# Patient Record
Sex: FEMALE | Race: BLACK OR AFRICAN AMERICAN | Employment: UNEMPLOYED | URBAN - METROPOLITAN AREA
[De-identification: names, ages, dates, MRNs, and addresses within clinical notes are randomized per-mention and may not be internally consistent; named-entity substitution may affect disease eponyms.]

---

## 2017-04-17 ENCOUNTER — HOSPITAL ENCOUNTER (OUTPATIENT)
Dept: MRI IMAGING | Age: 62
Discharge: HOME | End: 2017-04-17
Attending: ORTHOPAEDIC SURGERY
Payer: MEDICAID

## 2017-04-17 DIAGNOSIS — M17.12 PRIMARY OSTEOARTHRITIS OF LEFT KNEE: ICD-10-CM

## 2017-04-17 PROCEDURE — 73721 MRI JNT OF LWR EXTRE W/O DYE: CPT

## 2017-04-27 ENCOUNTER — HOSPITAL ENCOUNTER (OUTPATIENT)
Dept: MAMMOGRAPHY | Age: 62
Discharge: HOME | End: 2017-04-27
Attending: FAMILY MEDICINE
Payer: MEDICAID

## 2017-04-27 DIAGNOSIS — Z12.31 VISIT FOR SCREENING MAMMOGRAM: ICD-10-CM

## 2017-04-27 PROCEDURE — 77067 SCR MAMMO BI INCL CAD: CPT

## 2018-10-09 ENCOUNTER — HOSPITAL ENCOUNTER (OUTPATIENT)
Dept: MAMMOGRAPHY | Age: 63
Discharge: HOME | End: 2018-10-09
Attending: FAMILY MEDICINE
Payer: MEDICAID

## 2018-10-09 DIAGNOSIS — Z12.31 VISIT FOR SCREENING MAMMOGRAM: ICD-10-CM

## 2018-10-09 PROCEDURE — 77067 SCR MAMMO BI INCL CAD: CPT

## 2019-05-07 ENCOUNTER — APPOINTMENT (OUTPATIENT)
Dept: PHYSICAL THERAPY | Age: 64
End: 2019-05-07
Payer: MEDICAID

## 2019-05-15 ENCOUNTER — HOSPITAL ENCOUNTER (OUTPATIENT)
Dept: PHYSICAL THERAPY | Age: 64
Discharge: HOME | End: 2019-05-15
Payer: MEDICAID

## 2019-05-15 PROCEDURE — 97110 THERAPEUTIC EXERCISES: CPT

## 2019-05-15 PROCEDURE — 97530 THERAPEUTIC ACTIVITIES: CPT

## 2019-05-15 PROCEDURE — 97163 PT EVAL HIGH COMPLEX 45 MIN: CPT

## 2019-05-15 NOTE — PROGRESS NOTES
[facility]ITO SAYRA [facility] 
[address] [place], [address] [place] [address] 
[phone] fax Plan of Care/ Statement of Necessity for Physical Therapy Services Patient name: Eligio Souza Start of Care: 5/15/2019 Referral source: Alec Conley MD : 1955 Medical Diagnosis: Muscle weakness (generalized) [M62.81] Payor: [facility] MEDICAID / Plan: [address] [place] [address] / Product Type: Managed Care Medicaid /  Onset Date:19 Treatment Diagnosis: low back, knee, ankle pain Prior Hospitalization: see medical history Provider#: [id number] Medications: Verified on Patient summary List  
 Comorbidities: arthritis, bronchitis, thyroid problems, high blood pressure, asthma Prior Level of Function: Functionally independent, was ambulating without Rollator, lives alone The Plan of Care and following information is based on the information from the initial evaluation. Assessment/ key information: Patient is a pleasant 60year old female who presents with complaints of chronic back, bilateral knee, and Right ankle pain over the past several years due to general wear and tear, with recent worsening over this year. She reports using a Rollator the past 7-8 months, but overall very limited standing and walking tolerance, limiting ability to cook meals and relying on family to go to the store for her. Unable to ambulate to mailbox. At evaluation Patient demonstrates the following lumbar AROM: flexion 75% of full with Right sided back pain, extension 25% of full with pain, rotation 25% of full bilaterally with pain, lateral flexion Right 75% of full and Left WNL with pain. LE strength impaired due to pain: hip rotation Right 4-/5 Left 4+/5, knee extension Right 3-/5 Left 4+/5, knee flexion Right 4-/5 Left 5/5, hip flexion Right 3-/5 Left 4/5. Very tender in the lumbar musculature.  Poor bridging abilities due to weakness, and Patient uses UEs to assist Right LE into bed. Overall she is a good rehab candidate based on motivation, and will benefit from skilled physical therapy in order to address the above deficits. Evaluation Complexity History HIGH Complexity :3+ comorbidities / personal factors will impact the outcome/ POC ; Examination MEDIUM Complexity : 3 Standardized tests and measures addressing body structure, function, activity limitation and / or participation in recreation  ;Presentation LOW Complexity : Stable, uncomplicated  ;Clinical Decision Making HIGH Complexity : FOTO score of 1- 25 Overall Complexity Rating: HIGH Problem List: pain affecting function, decrease ROM, decrease strength, edema affecting function, impaired gait/ balance, decrease ADL/ functional abilitiies, decrease activity tolerance, decrease flexibility/ joint mobility and decrease transfer abilities Treatment Plan may include any combination of the following: Therapeutic exercise, Therapeutic activities, Neuromuscular re-education, Physical agent/modality, Gait/balance training, Manual therapy, Aquatic therapy, Patient education, Self Care training, Functional mobility training, Home safety training and Stair training Patient / Family readiness to learn indicated by: asking questions, trying to perform skills and interest 
Persons(s) to be included in education: patient (P) Barriers to Learning/Limitations: None Patient Goal (s): to feel better Patient Self Reported Health Status: poor Rehabilitation Potential: fair Short Term Goals: To be accomplished in 1 weeks: 
Goal: Patient will be independent and compliant with HEP in order to improve ease of mobility. Status at last note/certification: Issued and reviewed Goal: Patient will initiate aquatic therapy without incident or increase in pain in order to progress toward long term goals. Status at last note/certification n/a Long Term Goals: To be accomplished in 10 treatments: Goal: Patient will improve FOTO assessment score to 34 pts in order to indicate improved functional abilities. Status at last note/certification: 22 pts Goal: Patient will improve lumbar AROM to at least 75% of full all planes without increased pain in order to improve ease of daily tasks. Status at last note/certification: flexion 75% of full with Right sided back pain, extension 25% of full with pain, rotation 25% of full bilaterally with pain, lateral flexion Right 75% of full and Left WNL with pain Goal: Patient will improve Right LE strength to at least 4+/5 in order to improve stability with ambulation. Status at last note/certification: hip flexion/knee extension 3-/5, knee flexion 4-/5, hip rotation 4-/5 Goal: Patient will report worst back/knee/ankle pain as 6/10 or less in order to be able to walk around a room with no limitation. Status at last note/certification: 43/11; limited a lot Frequency / Duration: Patient to be seen 2 times per week for 10 treatments. Patient/ Caregiver education and instruction: Diagnosis, prognosis, exercises 
 [x]  Plan of care has been reviewed with MODE Eden, PT 5/15/2019 9:34 AM 
_____________________________________________________________________ I certify that the above Therapy Services are being furnished while the patient is under my care. I agree with the treatment plan and certify that this therapy is necessary. [de-identified] Signature:____________Date:_________TIME:________ 
 
[employer], Date and Time must be completed for valid certification ** Please sign and return to [facility] GATITO VALENZUELA [facility], [address] [place] [address] 
[phone] fax

## 2019-05-15 NOTE — PROGRESS NOTES
PT DAILY TREATMENT NOTE 10-18 Patient Name: Bee Blanchard Date:5/15/2019 : 1955 [x]  Patient  Verified Payor: [facility] MEDICAID / Plan: [address] [place] [address] / Product Type: Managed Care Medicaid / In time:945  Out time:1020 Total Treatment Time (min): 35 Visit #: 1 of 10 Medicare/BCBS Only Total Timed Codes (min):   1:1 Treatment Time:    
 
 
Treatment Area: Muscle weakness (generalized) [M62.81] SUBJECTIVE Pain Level (0-10 scale): 9-10 Any medication changes, allergies to medications, adverse drug reactions, diagnosis change, or new procedure performed?: [x] No    [] Yes (see summary sheet for update) Subjective functional status/changes:   [] No changes reported OBJECTIVE Modality rationale:   
Min Type Additional Details  
 [] Estim:  []Unatt       []IFC  []Premod []Other:  []w/ice   []w/heat Position: Location:  
 [] Estim: []Att    []TENS instruct  []NMES []Other:  []w/US   []w/ice   []w/heat Position: Location:  
 []  Traction: [] Cervical       []Lumbar 
                     [] Prone          []Supine []Intermittent   []Continuous Lbs: 
[] before manual 
[] after manual  
 []  Ultrasound: []Continuous   [] Pulsed []1MHz   []3MHz W/cm2: 
Location:  
 []  Iontophoresis with dexamethasone Location: [] Take home patch  
[] In clinic  
 []  Ice     []  heat 
[]  Ice massage 
[]  Laser  
[]  Anodyne Position: Location:  
 []  Laser with stim 
[]  Other:  Position: Location:  
 []  Vasopneumatic Device Pressure:       [] lo [] med [] hi  
Temperature: [] lo [] med [] hi  
[] Skin assessment post-treatment:  []intact []redness- no adverse reaction 
  []redness  adverse reaction:  
 
15 min [x]Eval                  []Re-Eval  
 
 
10 min Therapeutic Exercise:  [] See flow sheet :HEP Rationale: increase ROM and increase strength to improve the patients ability to perform ADL 
 
10 min Therapeutic Activity:  []  See flow sheet :  
Rationale: Patient education on squatting mechanics, diagnosis, benefits of aquatic therapy  to improve the patients ability to improve therapy outcomes With 
 [] TE 
 [] TA 
 [] neuro 
 [] other: Patient Education: [x] Review HEP [] Progressed/Changed HEP based on:  
[] positioning   [] body mechanics   [] transfers   [] heat/ice application   
[] other:   
 
Other Objective/Functional Measures:   
 
Pain Level (0-10 scale) post treatment: 9-10 ASSESSMENT/Changes in Function:  
 
Patient will continue to benefit from skilled PT services to modify and progress therapeutic interventions, address functional mobility deficits, address ROM deficits, address strength deficits, analyze and address soft tissue restrictions, analyze and cue movement patterns, analyze and modify body mechanics/ergonomics, assess and modify postural abnormalities, address imbalance/dizziness and instruct in home and community integration to attain remaining goals. [x]  See Plan of Care 
[]  See progress note/recertification 
[]  See Discharge Summary Progress towards goals / Updated goals: 
See POC PLAN 
[]  Upgrade activities as tolerated     [x]  Continue plan of care 
[]  Update interventions per flow sheet      
[]  Discharge due to:_ 
[]  Other:_   
 
Kaila Choi, PT 5/15/2019  9:34 AM 
 
Future Appointments Date Time Provider Abdullahi Hernandez 5/15/2019  9:45 AM Izzy Bailey, PT [facility] [address]

## 2019-05-28 ENCOUNTER — HOSPITAL ENCOUNTER (OUTPATIENT)
Dept: PHYSICAL THERAPY | Age: 64
Discharge: HOME | End: 2019-05-28
Payer: MEDICAID

## 2019-05-28 PROCEDURE — 97113 AQUATIC THERAPY/EXERCISES: CPT

## 2019-05-30 ENCOUNTER — HOSPITAL ENCOUNTER (OUTPATIENT)
Dept: PHYSICAL THERAPY | Age: 64
End: 2019-05-30
Payer: MEDICAID

## 2019-06-04 ENCOUNTER — HOSPITAL ENCOUNTER (OUTPATIENT)
Dept: PHYSICAL THERAPY | Age: 64
Discharge: HOME | End: 2019-06-04
Payer: MEDICAID

## 2019-06-04 PROCEDURE — 97113 AQUATIC THERAPY/EXERCISES: CPT

## 2019-06-06 ENCOUNTER — HOSPITAL ENCOUNTER (OUTPATIENT)
Dept: PHYSICAL THERAPY | Age: 64
Discharge: HOME | End: 2019-06-06
Payer: MEDICAID

## 2019-06-06 PROCEDURE — 97113 AQUATIC THERAPY/EXERCISES: CPT

## 2019-06-11 ENCOUNTER — HOSPITAL ENCOUNTER (OUTPATIENT)
Dept: PHYSICAL THERAPY | Age: 64
Discharge: HOME | End: 2019-06-11
Payer: MEDICAID

## 2019-06-11 PROCEDURE — 97113 AQUATIC THERAPY/EXERCISES: CPT

## 2019-06-13 ENCOUNTER — HOSPITAL ENCOUNTER (OUTPATIENT)
Dept: PHYSICAL THERAPY | Age: 64
Discharge: HOME | End: 2019-06-13
Payer: MEDICAID

## 2019-06-13 PROCEDURE — 97113 AQUATIC THERAPY/EXERCISES: CPT

## 2019-06-18 ENCOUNTER — HOSPITAL ENCOUNTER (OUTPATIENT)
Dept: PHYSICAL THERAPY | Age: 64
Discharge: HOME | End: 2019-06-18
Payer: MEDICAID

## 2019-06-18 PROCEDURE — 97113 AQUATIC THERAPY/EXERCISES: CPT

## 2019-06-18 NOTE — PROGRESS NOTES
[facility] GATITO VALENZUELA [facility], [address] [place] [address]  [phone] fax    Progress Note  Patient name: Atul Huizar Start of Care: 5/15/2019   Referral source: Christine Lazo MD : 1955               Medical Diagnosis: Muscle weakness (generalized) [M62.81]  Payor: [facility] MEDICAID / Plan: [address] [place] [address] / Product Type: Managed Care Medicaid /  Onset Date:19               Treatment Diagnosis: low back, knee, ankle pain   Prior Hospitalization: see medical history Provider#: [id number]   Medications: Verified on Patient summary List    Comorbidities: arthritis, bronchitis, thyroid problems, high blood pressure, asthma   Prior Level of Function: Functionally independent, was ambulating without Rollator, lives alone    Visits from Start of Care: 7    Missed Visits: 1    Short Term Goals: To be accomplished in 1 weeks:  Goal: Patient will be independent and compliant with HEP in order to improve ease of mobility. Status at last note/certification: Issued and reviewed  Current status: met - Pt compliant with HEP  Goal: Patient will initiate aquatic therapy without incident or increase in pain in order to progress toward long term goals. Status at last note/certification n/a  Current status: met  Long Term Goals: To be accomplished in 10 treatments:  Goal: Patient will improve FOTO assessment score to 34 pts in order to indicate improved functional abilities. Status at last note/certification: 22 pts  Current status: met - FOTO 45 pts  Goal: Patient will improve lumbar AROM to at least 75% of full all planes without increased pain in order to improve ease of daily tasks.   Status at last note/certification: flexion 75% of full with Right sided back pain, extension 25% of full with pain, rotation 25% of full  bilaterally with pain, lateral flexion Right 75% of full and Left WNL with pain  Current status: progressing - L/S AROM 75% of full with increased pain  Goal: Patient will improve Right LE strength to at least 4+/5 in order to improve stability with ambulation. Status at last note/certification: hip flexion/knee extension 3-/5, knee flexion 4-/5, hip rotation 4-/5  Current status: progressing - right knee flex/ext 3+/5, right hip flex/ext 3+/5, right hip IR/ER 4+/5  Goal: Patient will report worst back/knee/ankle pain as 6/10 or less in order to be able to walk around a room with no limitation. Status at last note/certification: 22/88; limited a lot  Current status: not met - 10/10 pain at worst; limited a lot    Key Functional Changes:   Functional Gains: Pt reports increased strength in lower back, knees, ankle; Pt notes increased knowledge of exercises to improve mobility and strength  Functional Deficits: Pt continues to report decreased walking tolerance, pain when performing ADLs  % improvement: 80%  Pain   Average: 9/10       Best: 5/10     Worst: 10/10    Updated Goals: to be achieved in 3 weeks:  Goal: Patient will improve lumbar AROM to at least 75% of full all planes without increased pain in order to improve ease of daily tasks. Status at last note/certification: flexion 75% of full with Right sided back pain, extension 25% of full with pain, rotation 25% of full  bilaterally with pain, lateral flexion Right 75% of full and Left WNL with pain  Current status: progressing - L/S AROM 75% of full with increased pain  Goal: Patient will improve Right LE strength to at least 4+/5 in order to improve stability with ambulation. Status at last note/certification: hip flexion/knee extension 3-/5, knee flexion 4-/5, hip rotation 4-/5  Current status: progressing - right knee flex/ext 3+/5, right hip flex/ext 3+/5, right hip IR/ER 4+/5  Goal: Patient will report worst back/knee/ankle pain as 6/10 or less in order to be able to walk around a room with no limitation.   Status at last note/certification: 69/57; limited a lot  Current status: not met - 10/10 pain at worst; limited a lot    ASSESSMENT/RECOMMENDATIONS:  [x]Continue therapy per initial plan/protocol at a frequency of  2 x per week for 3 weeks  []Continue therapy with the following recommended changes:_____________________      _____________________________________________________________________  []Discontinue therapy progressing towards or have reached established goals  []Discontinue therapy due to lack of appreciable progress towards goals  []Discontinue therapy due to lack of attendance or compliance  []Await Physician's recommendations/decisions regarding therapy  []Other:________________________________________________________________    Thank you for this referral.   Gio Morin, PT 6/18/2019 1:55 PM  NOTE TO PHYSICIAN:  [address] AND   FAX TO [facility]: [phone]  If you are unable to process this request in 24 hours please contact our office: [phone]  []  I have read the above report and request that my patient continue as recommended. []  I have read the above report and request that my patient continue therapy with the following changes/special instructions:________________________________________  []I have read the above report and request that my patient be discharged from therapy.     [de-identified] Signature:____________Date:_________TIME:________    [facility], Date and Time must be completed for valid certification **

## 2019-06-20 ENCOUNTER — HOSPITAL ENCOUNTER (OUTPATIENT)
Dept: PHYSICAL THERAPY | Age: 64
Discharge: HOME | End: 2019-06-20
Payer: MEDICAID

## 2019-06-20 PROCEDURE — 97113 AQUATIC THERAPY/EXERCISES: CPT

## 2019-06-25 ENCOUNTER — APPOINTMENT (OUTPATIENT)
Dept: PHYSICAL THERAPY | Age: 64
End: 2019-06-25
Payer: MEDICAID

## 2019-06-27 ENCOUNTER — HOSPITAL ENCOUNTER (OUTPATIENT)
Dept: PHYSICAL THERAPY | Age: 64
Discharge: HOME | End: 2019-06-27
Payer: MEDICAID

## 2019-06-27 PROCEDURE — 97113 AQUATIC THERAPY/EXERCISES: CPT

## 2019-07-02 ENCOUNTER — HOSPITAL ENCOUNTER (OUTPATIENT)
Dept: PHYSICAL THERAPY | Age: 64
Discharge: HOME | End: 2019-07-02
Payer: MEDICAID

## 2019-07-02 PROCEDURE — 97113 AQUATIC THERAPY/EXERCISES: CPT

## 2019-07-02 NOTE — PROGRESS NOTES
[facility] GATITO VALENZUELA [facility], [address] [place] [address]  [phone] fax    Discharge Summary    Patient name: Rubi Lucero Pop of Care: 5/15/2019   Referral source: Meir Whatley MD MSI: [id number]/[id number]               Medical Diagnosis: Muscle weakness (generalized) [M62.81]  Payor: [facility] MEDICAID / Plan: [address] [place] [address] / Product Type: Managed Care Medicaid /  Onset Date:4/25/19               Treatment Diagnosis: low back, knee, ankle pain   Prior Hospitalization: see medical history Provider#: [id number]   Medications: Verified on Patient summary List    Comorbidities: arthritis, bronchitis, thyroid problems, high blood pressure, asthma   Prior Level of Function: Functionally independent, was ambulating without Rollator, lives alone      Visits from Start of Care: 10    Missed Visits: 1    Reporting Period : 6/18/19 to 7/2/19    -Goal: Patient will improve lumbar AROM to at least 75% of full all planes without increased pain in order to improve ease of daily tasks. Status at last note/certification: flexion 75% of full with Right sided back pain, extension 25% of full with pain, rotation 25% of full  bilaterally with pain, lateral flexion Right 75% of full and Left WNL with pain  Current status: progressing - improved L/S AROM 75% of full but still performed with increased pain  -Goal: Patient will improve Right LE strength to at least 4+/5 in order to improve stability with ambulation. Status at last note/certification: hip flexion/knee extension 3-/5, knee flexion 4-/5, hip rotation 4-/5  Current status: progressing - all right LE strength at least 4+/5 except right hip flexion (4/5)  -Goal: Patient will report worst back/knee/ankle pain as 6/10 or less in order to be able to walk around a room with no limitation.   Status at last note/certification: 92/91; limited a lot  Current status: progressing - 8/10 pain at worst; limited a lot      Assessment/ Summary of Care: Patient has attended aquatic therapy for 10 sessions for the treatment of low back pain, bilateral knee pain, and bilateral ankle pain. At this time, patient reports 80% improvement since start of care with improvements in reported maximal pain levels, bilateral LE strength, and improved lumbar AROM. She continue to progress toward reaching her goals in all of these areas but reports that she is ready for discharge at this time as she feels that she is independent with an aquatic HEP. RECOMMENDATIONS:  [x]Discontinue therapy: [x]Patient has reached or is progressing toward set goals and is independent with aquatic HEP      []Patient is non-compliant or has abdicated      []Due to lack of appreciable progress towards set goals    Chuyita Quiet 7/2/2019 1:00 PM    NOTE TO PHYSICIAN:  Please complete the following and fax to: [facility] at [place] at [phone]  . Retain this original for your records. If you are unable to process this request in   24 hours, please contact our office.      [] I have read the above report and request that my patient continue therapy with the following changes/special instructions:  [] I have read the above report and request that my patient be discharged from therapy    Physician's Signature:____________Date:_________TIME:________    ** Signature, Date and Time must be completed for valid certification **

## 2019-07-09 ENCOUNTER — APPOINTMENT (OUTPATIENT)
Dept: PHYSICAL THERAPY | Age: 64
End: 2019-07-09
Payer: MEDICAID

## 2019-07-11 ENCOUNTER — APPOINTMENT (OUTPATIENT)
Dept: PHYSICAL THERAPY | Age: 64
End: 2019-07-11
Payer: MEDICAID

## 2019-07-16 ENCOUNTER — APPOINTMENT (OUTPATIENT)
Dept: PHYSICAL THERAPY | Age: 64
End: 2019-07-16
Payer: MEDICAID

## 2019-07-18 ENCOUNTER — APPOINTMENT (OUTPATIENT)
Dept: PHYSICAL THERAPY | Age: 64
End: 2019-07-18
Payer: MEDICAID

## 2019-07-23 ENCOUNTER — APPOINTMENT (OUTPATIENT)
Dept: PHYSICAL THERAPY | Age: 64
End: 2019-07-23
Payer: MEDICAID

## 2019-10-22 ENCOUNTER — HOSPITAL ENCOUNTER (OUTPATIENT)
Dept: MAMMOGRAPHY | Age: 64
Discharge: HOME | End: 2019-10-22
Attending: FAMILY MEDICINE
Payer: MEDICAID

## 2019-10-22 DIAGNOSIS — Z12.31 VISIT FOR SCREENING MAMMOGRAM: ICD-10-CM

## 2019-10-22 PROCEDURE — 77067 SCR MAMMO BI INCL CAD: CPT

## 2020-09-30 ENCOUNTER — TRANSCRIBE ORDER (OUTPATIENT)
Dept: SCHEDULING | Age: 65
End: 2020-09-30

## 2020-09-30 DIAGNOSIS — Z12.31 VISIT FOR SCREENING MAMMOGRAM: Primary | ICD-10-CM

## 2020-11-18 ENCOUNTER — HOSPITAL ENCOUNTER (OUTPATIENT)
Dept: ULTRASOUND IMAGING | Age: 65
Discharge: HOME | End: 2020-11-18
Attending: FAMILY MEDICINE
Payer: MEDICAID

## 2020-11-18 ENCOUNTER — HOSPITAL ENCOUNTER (OUTPATIENT)
Dept: MAMMOGRAPHY | Age: 65
Discharge: HOME | End: 2020-11-18
Attending: FAMILY MEDICINE
Payer: MEDICAID

## 2020-11-18 DIAGNOSIS — R59.9 ENLARGED LYMPH NODE: ICD-10-CM

## 2020-11-18 DIAGNOSIS — Z12.31 VISIT FOR SCREENING MAMMOGRAM: ICD-10-CM

## 2020-11-18 DIAGNOSIS — R92.2 INCONCLUSIVE MAMMOGRAM: ICD-10-CM

## 2020-11-18 DIAGNOSIS — R92.8 ABNORMAL FINDING ON BREAST IMAGING: ICD-10-CM

## 2020-11-18 PROCEDURE — 76642 ULTRASOUND BREAST LIMITED: CPT

## 2020-11-18 PROCEDURE — 77067 SCR MAMMO BI INCL CAD: CPT

## 2020-12-02 ENCOUNTER — HOSPITAL ENCOUNTER (OUTPATIENT)
Dept: ULTRASOUND IMAGING | Age: 65
Discharge: HOME | End: 2020-12-02
Attending: FAMILY MEDICINE
Payer: MEDICAID

## 2020-12-02 ENCOUNTER — HOSPITAL ENCOUNTER (OUTPATIENT)
Dept: MAMMOGRAPHY | Age: 65
Discharge: HOME | End: 2020-12-02
Attending: FAMILY MEDICINE
Payer: MEDICAID

## 2020-12-02 DIAGNOSIS — R59.9 LYMPH NODES ENLARGED: ICD-10-CM

## 2020-12-02 PROCEDURE — 88307 TISSUE EXAM BY PATHOLOGIST: CPT

## 2020-12-02 PROCEDURE — 74011000250 HC RX REV CODE- 250: Performed by: RADIOLOGY

## 2020-12-02 PROCEDURE — 38505 NEEDLE BIOPSY LYMPH NODES: CPT

## 2020-12-02 PROCEDURE — 88305 TISSUE EXAM BY PATHOLOGIST: CPT

## 2020-12-02 RX ORDER — LIDOCAINE HYDROCHLORIDE 10 MG/ML
10 INJECTION INFILTRATION; PERINEURAL
Status: COMPLETED | OUTPATIENT
Start: 2020-12-02 | End: 2020-12-02

## 2020-12-02 RX ADMIN — LIDOCAINE HYDROCHLORIDE 10 ML: 10 INJECTION, SOLUTION INFILTRATION; PERINEURAL at 13:27

## 2020-12-29 ENCOUNTER — TRANSCRIBE ORDER (OUTPATIENT)
Dept: SCHEDULING | Age: 65
End: 2020-12-29

## 2020-12-29 DIAGNOSIS — M17.11 ARTHRITIS OF RIGHT KNEE: Primary | ICD-10-CM

## 2021-01-08 ENCOUNTER — HOSPITAL ENCOUNTER (OUTPATIENT)
Dept: VASCULAR SURGERY | Age: 66
Discharge: HOME | End: 2021-01-08
Attending: ORTHOPAEDIC SURGERY
Payer: MEDICAID

## 2021-01-08 DIAGNOSIS — M17.11 ARTHRITIS OF RIGHT KNEE: ICD-10-CM

## 2021-01-08 PROCEDURE — 93971 EXTREMITY STUDY: CPT

## 2025-07-16 ENCOUNTER — HOSPITAL ENCOUNTER (OUTPATIENT)
Facility: HOSPITAL | Age: 70
Setting detail: RECURRING SERIES
Discharge: HOME | End: 2025-07-19
Payer: MEDICARE

## 2025-07-16 PROCEDURE — 97163 PT EVAL HIGH COMPLEX 45 MIN: CPT

## 2025-07-16 NOTE — PROGRESS NOTES
PT DAILY TREATMENT NOTE/LUMBAR EVAL     Patient Name: Kristin Dalton    Date: 2025    : 1955  Insurance: Payor: [employer] / [facility] / Product Type: *No Product type* /      Patient  verified yes     Visit #   Current / Total 1 24   Time   In / Out 11:15 11:40   Pain   In / Out 8/10 8/10   Subjective Functional Status/Changes: See below       Treatment Area: Low back pain, unspecified [M54.50]  Radiculopathy, lumbar region [M54.16]  SUBJECTIVE  Pain Level (0-10 scale): Best: 810 Worst: /10  []constant []intermittent []improving []worsening []no change since onset    Current symptoms/Complaints: Patient states she has had LBP and right LE pain for many years. States the pain is constant in her back and right LE. States she has participated in aquatic PT here in the past and it has been beneficial. States she had 4-6 months of pain relief after stopping the aquatic PT. Patient reports 2 falls in past 6 months - falls occurred when walking without cane or rollator. States right LE giving away caused falls. .    PMH: RA, asthma, HTN, chronic bronchitis, alcohol and tobacco use      OBJECTIVE/EXAMINATION    Mobility: [] No assistive device [] RW [x] Rollator [] Hemiwalker [] LBQC [] SBQC [] SPC [] Other:       25 min []Eval        - untimed        [x]  Patient Education billed concurrently with other procedures     Other Objective/Functional Measures:     Physical Therapy Evaluation - Lumbar Spine ([facility])    SUBJECTIVE  Chief Complaint:    Mechanism of injury:    Symptoms:  Aggravated by:   [x] Bending [] Sitting [] Standing [x] Walking   [x] Moving [] Cough [] Sneeze [] Valsalva   [] AM  [] PM  Lying:  [] sup   [] pro   [] sidelying   [] Other:     Eased by:    [] Bending [] Sitting [] Standing [] Walking   [] Moving [] AM  [] PM  Lying: [] sup  [] pro  [] sidelying   [] Other:     General Health:  Red Flags Indicated? [] Yes    [] No  [] Yes [] No Recent

## 2025-07-16 NOTE — PROGRESS NOTES
[facility] [place] [facility]  [address] [facility], [place] [address] Phone: [phone] Fax [phone]  Plan of Care / Statement of Necessity for Physical Therapy Services     Patient Name: Kristin Dalton : 1955   Treatment   Diagnosis: M54.59  OTHER LOWER BACK PAIN and M54.41  LUMBAGO WITH SCIATICA, RIGHT SIDE Medical Diagnosis: Low back pain, unspecified [M54.50]  Radiculopathy, lumbar region [M54.16]  Other low back pain [M54.59]   Onset Date: chronic Payor Source: Payor: [facility] [employer] / Plan: [employer] [facility] COMPLETE HMO / Product Type: *No Product type* /    Referral Source: Yobani Allan PA-C Start of Care (SOC): 2025   Prior Hospitalization: See medical history Provider #: [id number]   Prior Level of Function: Has PCA for help with ADL's, ambulating in community with rollator and SPC or rollator in her home   Comorbidities: RA, asthma, HTN, chronic bronchitis, alcohol and tobacco use      Assessment / key information:  Patient is a 70 year old female with chief complaint of low back and radicular pain into right LE. Patient states she has had LBP and right LE pain for many years. States the pain is constant in her back and right LE. It is aggravated by movement. . Patient reports 2 falls in past 6 months - falls occurred when walking without cane or rollator. States right LE giving away caused falls. States she has participated in aquatic PT here in the past, and it has been beneficial. States she had 4-6 months of pain relief after stopping the aquatic PT. She would like to perform aquatic therapy again. We discussed the possibility of her joining a facility with an indoor pool so she can continue exercises independently upon discharge from physical therapy. Patient is agreeable to this plan.     Lumbar Active Movements: [] N/A   [] Too acute   [] Other:  ROM % AROM % PROM Comments:pain, area   Forward flexion 40-60 75%   P!

## 2025-07-29 ENCOUNTER — HOSPITAL ENCOUNTER (OUTPATIENT)
Facility: HOSPITAL | Age: 70
Setting detail: RECURRING SERIES
Discharge: HOME | End: 2025-08-01
Payer: MEDICARE

## 2025-07-29 PROCEDURE — 97113 AQUATIC THERAPY/EXERCISES: CPT

## 2025-08-05 ENCOUNTER — HOSPITAL ENCOUNTER (OUTPATIENT)
Facility: HOSPITAL | Age: 70
Setting detail: RECURRING SERIES
Discharge: HOME | End: 2025-08-08
Payer: MEDICARE

## 2025-08-05 PROCEDURE — 97113 AQUATIC THERAPY/EXERCISES: CPT

## 2025-08-07 ENCOUNTER — HOSPITAL ENCOUNTER (OUTPATIENT)
Facility: HOSPITAL | Age: 70
Setting detail: RECURRING SERIES
Discharge: HOME | End: 2025-08-10
Payer: MEDICARE

## 2025-08-07 PROCEDURE — 97113 AQUATIC THERAPY/EXERCISES: CPT

## 2025-08-12 ENCOUNTER — HOSPITAL ENCOUNTER (OUTPATIENT)
Facility: HOSPITAL | Age: 70
Setting detail: RECURRING SERIES
Discharge: HOME | End: 2025-08-15
Payer: MEDICARE

## 2025-08-12 PROCEDURE — 97113 AQUATIC THERAPY/EXERCISES: CPT

## 2025-08-14 ENCOUNTER — HOSPITAL ENCOUNTER (OUTPATIENT)
Facility: HOSPITAL | Age: 70
Setting detail: RECURRING SERIES
Discharge: HOME | End: 2025-08-17
Payer: MEDICARE

## 2025-08-14 PROCEDURE — 97530 THERAPEUTIC ACTIVITIES: CPT

## 2025-08-14 PROCEDURE — 97113 AQUATIC THERAPY/EXERCISES: CPT

## 2025-08-19 ENCOUNTER — HOSPITAL ENCOUNTER (OUTPATIENT)
Facility: HOSPITAL | Age: 70
Setting detail: RECURRING SERIES
Discharge: HOME | End: 2025-08-22
Payer: MEDICARE

## 2025-08-19 PROCEDURE — 97113 AQUATIC THERAPY/EXERCISES: CPT

## 2025-08-21 ENCOUNTER — HOSPITAL ENCOUNTER (OUTPATIENT)
Facility: HOSPITAL | Age: 70
Setting detail: RECURRING SERIES
Discharge: HOME | End: 2025-08-24
Payer: MEDICARE

## 2025-08-21 PROCEDURE — 97113 AQUATIC THERAPY/EXERCISES: CPT

## 2025-08-26 ENCOUNTER — HOSPITAL ENCOUNTER (OUTPATIENT)
Facility: HOSPITAL | Age: 70
Setting detail: RECURRING SERIES
Discharge: HOME | End: 2025-08-29
Payer: MEDICARE

## 2025-08-26 PROCEDURE — 97113 AQUATIC THERAPY/EXERCISES: CPT

## 2025-08-28 ENCOUNTER — HOSPITAL ENCOUNTER (OUTPATIENT)
Facility: HOSPITAL | Age: 70
Setting detail: RECURRING SERIES
Discharge: HOME | End: 2025-08-31
Payer: MEDICARE

## 2025-08-28 PROCEDURE — 97113 AQUATIC THERAPY/EXERCISES: CPT

## 2025-09-02 ENCOUNTER — HOSPITAL ENCOUNTER (OUTPATIENT)
Facility: HOSPITAL | Age: 70
Setting detail: RECURRING SERIES
Discharge: HOME | End: 2025-09-05
Payer: MEDICARE

## 2025-09-02 PROCEDURE — 97113 AQUATIC THERAPY/EXERCISES: CPT
